# Patient Record
Sex: FEMALE | Race: WHITE | NOT HISPANIC OR LATINO | Employment: UNEMPLOYED | URBAN - NONMETROPOLITAN AREA
[De-identification: names, ages, dates, MRNs, and addresses within clinical notes are randomized per-mention and may not be internally consistent; named-entity substitution may affect disease eponyms.]

---

## 2018-05-28 ENCOUNTER — APPOINTMENT (OUTPATIENT)
Dept: CT IMAGING | Facility: HOSPITAL | Age: 28
End: 2018-05-28

## 2018-05-28 ENCOUNTER — HOSPITAL ENCOUNTER (INPATIENT)
Facility: HOSPITAL | Age: 28
LOS: 4 days | Discharge: HOME | End: 2018-06-01
Attending: EMERGENCY MEDICINE | Admitting: INTERNAL MEDICINE

## 2018-05-28 DIAGNOSIS — N10 PYELONEPHRITIS, ACUTE: Primary | ICD-10-CM

## 2018-05-28 LAB
ALBUMIN SERPL-MCNC: 3.7 G/DL (ref 3.5–5)
ALBUMIN/GLOB SERPL: 1.2 G/DL (ref 1.1–2.5)
ALP SERPL-CCNC: 120 U/L (ref 24–120)
ALT SERPL W P-5'-P-CCNC: 27 U/L (ref 0–54)
ANION GAP SERPL CALCULATED.3IONS-SCNC: 12 MMOL/L (ref 4–13)
AST SERPL-CCNC: 27 U/L (ref 7–45)
BACTERIA UR QL AUTO: ABNORMAL /HPF
BASOPHILS # BLD AUTO: 0.07 10*3/MM3 (ref 0–0.2)
BASOPHILS NFR BLD AUTO: 0.3 % (ref 0–2)
BILIRUB SERPL-MCNC: 0.4 MG/DL (ref 0.1–1)
BILIRUB UR QL STRIP: NEGATIVE
BUN BLD-MCNC: 10 MG/DL (ref 5–21)
BUN/CREAT SERPL: 18.5 (ref 7–25)
CALCIUM SPEC-SCNC: 9.6 MG/DL (ref 8.4–10.4)
CHLORIDE SERPL-SCNC: 101 MMOL/L (ref 98–110)
CLARITY UR: ABNORMAL
CO2 SERPL-SCNC: 23 MMOL/L (ref 24–31)
COLOR UR: YELLOW
CREAT BLD-MCNC: 0.54 MG/DL (ref 0.5–1.4)
D-LACTATE SERPL-SCNC: 0.7 MMOL/L (ref 0.5–2)
D-LACTATE SERPL-SCNC: 1.2 MMOL/L (ref 0.5–2)
D-LACTATE SERPL-SCNC: 2.6 MMOL/L (ref 0.5–2)
DEPRECATED RDW RBC AUTO: 44.8 FL (ref 40–54)
EOSINOPHIL # BLD AUTO: 0.01 10*3/MM3 (ref 0–0.7)
EOSINOPHIL NFR BLD AUTO: 0 % (ref 0–4)
ERYTHROCYTE [DISTWIDTH] IN BLOOD BY AUTOMATED COUNT: 14.1 % (ref 12–15)
GFR SERPL CREATININE-BSD FRML MDRD: 134 ML/MIN/1.73
GLOBULIN UR ELPH-MCNC: 3.1 GM/DL
GLUCOSE BLD-MCNC: 188 MG/DL (ref 70–100)
GLUCOSE UR STRIP-MCNC: NEGATIVE MG/DL
HCT VFR BLD AUTO: 40.8 % (ref 37–47)
HGB BLD-MCNC: 13.5 G/DL (ref 12–16)
HGB UR QL STRIP.AUTO: NEGATIVE
HOLD SPECIMEN: NORMAL
HYALINE CASTS UR QL AUTO: ABNORMAL /LPF
IMM GRANULOCYTES # BLD: 0.26 10*3/MM3 (ref 0–0.03)
IMM GRANULOCYTES NFR BLD: 1.1 % (ref 0–5)
KETONES UR QL STRIP: NEGATIVE
LEUKOCYTE ESTERASE UR QL STRIP.AUTO: ABNORMAL
LIPASE SERPL-CCNC: 30 U/L (ref 23–203)
LYMPHOCYTES # BLD AUTO: 1.46 10*3/MM3 (ref 0.72–4.86)
LYMPHOCYTES NFR BLD AUTO: 5.9 % (ref 15–45)
MCH RBC QN AUTO: 28.8 PG (ref 28–32)
MCHC RBC AUTO-ENTMCNC: 33.1 G/DL (ref 33–36)
MCV RBC AUTO: 87 FL (ref 82–98)
MONOCYTES # BLD AUTO: 1.83 10*3/MM3 (ref 0.19–1.3)
MONOCYTES NFR BLD AUTO: 7.4 % (ref 4–12)
NEUTROPHILS # BLD AUTO: 21.13 10*3/MM3 (ref 1.87–8.4)
NEUTROPHILS NFR BLD AUTO: 85.3 % (ref 39–78)
NITRITE UR QL STRIP: POSITIVE
NRBC BLD MANUAL-RTO: 0 /100 WBC (ref 0–0)
PH UR STRIP.AUTO: 6 [PH] (ref 5–8)
PLATELET # BLD AUTO: 352 10*3/MM3 (ref 130–400)
PMV BLD AUTO: 11.7 FL (ref 6–12)
POTASSIUM BLD-SCNC: 3.5 MMOL/L (ref 3.5–5.3)
PROT SERPL-MCNC: 6.8 G/DL (ref 6.3–8.7)
PROT UR QL STRIP: ABNORMAL
RBC # BLD AUTO: 4.69 10*6/MM3 (ref 4.2–5.4)
RBC # UR: ABNORMAL /HPF
REF LAB TEST METHOD: ABNORMAL
SODIUM BLD-SCNC: 136 MMOL/L (ref 135–145)
SP GR UR STRIP: 1.02 (ref 1–1.03)
SQUAMOUS #/AREA URNS HPF: ABNORMAL /HPF
UROBILINOGEN UR QL STRIP: ABNORMAL
WBC NRBC COR # BLD: 24.76 10*3/MM3 (ref 4.8–10.8)
WBC UR QL AUTO: ABNORMAL /HPF
WHOLE BLOOD HOLD SPECIMEN: NORMAL
WHOLE BLOOD HOLD SPECIMEN: NORMAL

## 2018-05-28 PROCEDURE — 74177 CT ABD & PELVIS W/CONTRAST: CPT

## 2018-05-28 PROCEDURE — 25010000002 MORPHINE PER 10 MG: Performed by: EMERGENCY MEDICINE

## 2018-05-28 PROCEDURE — 25010000002 HYDROMORPHONE PER 4 MG: Performed by: INTERNAL MEDICINE

## 2018-05-28 PROCEDURE — 25010000002 HYDROMORPHONE PER 4 MG: Performed by: EMERGENCY MEDICINE

## 2018-05-28 PROCEDURE — 87040 BLOOD CULTURE FOR BACTERIA: CPT | Performed by: INTERNAL MEDICINE

## 2018-05-28 PROCEDURE — 87086 URINE CULTURE/COLONY COUNT: CPT | Performed by: EMERGENCY MEDICINE

## 2018-05-28 PROCEDURE — 25010000002 IOPAMIDOL 61 % SOLUTION: Performed by: EMERGENCY MEDICINE

## 2018-05-28 PROCEDURE — 80053 COMPREHEN METABOLIC PANEL: CPT | Performed by: EMERGENCY MEDICINE

## 2018-05-28 PROCEDURE — 25010000002 PIPERACILLIN SOD-TAZOBACTAM PER 1 G: Performed by: INTERNAL MEDICINE

## 2018-05-28 PROCEDURE — 87040 BLOOD CULTURE FOR BACTERIA: CPT | Performed by: EMERGENCY MEDICINE

## 2018-05-28 PROCEDURE — 81001 URINALYSIS AUTO W/SCOPE: CPT | Performed by: EMERGENCY MEDICINE

## 2018-05-28 PROCEDURE — 83690 ASSAY OF LIPASE: CPT | Performed by: EMERGENCY MEDICINE

## 2018-05-28 PROCEDURE — 25010000002 CEFTRIAXONE PER 250 MG: Performed by: EMERGENCY MEDICINE

## 2018-05-28 PROCEDURE — 83605 ASSAY OF LACTIC ACID: CPT | Performed by: INTERNAL MEDICINE

## 2018-05-28 PROCEDURE — 87077 CULTURE AEROBIC IDENTIFY: CPT | Performed by: EMERGENCY MEDICINE

## 2018-05-28 PROCEDURE — 99284 EMERGENCY DEPT VISIT MOD MDM: CPT

## 2018-05-28 PROCEDURE — 83605 ASSAY OF LACTIC ACID: CPT | Performed by: EMERGENCY MEDICINE

## 2018-05-28 PROCEDURE — 85025 COMPLETE CBC W/AUTO DIFF WBC: CPT | Performed by: EMERGENCY MEDICINE

## 2018-05-28 PROCEDURE — 87186 SC STD MICRODIL/AGAR DIL: CPT | Performed by: EMERGENCY MEDICINE

## 2018-05-28 PROCEDURE — 25010000002 METOCLOPRAMIDE PER 10 MG: Performed by: EMERGENCY MEDICINE

## 2018-05-28 RX ORDER — SODIUM CHLORIDE 0.9 % (FLUSH) 0.9 %
1-10 SYRINGE (ML) INJECTION AS NEEDED
Status: DISCONTINUED | OUTPATIENT
Start: 2018-05-28 | End: 2018-06-01 | Stop reason: HOSPADM

## 2018-05-28 RX ORDER — SODIUM CHLORIDE 9 MG/ML
125 INJECTION, SOLUTION INTRAVENOUS CONTINUOUS
Status: DISCONTINUED | OUTPATIENT
Start: 2018-05-28 | End: 2018-05-30

## 2018-05-28 RX ORDER — MORPHINE SULFATE 4 MG/ML
4 INJECTION, SOLUTION INTRAMUSCULAR; INTRAVENOUS ONCE
Status: COMPLETED | OUTPATIENT
Start: 2018-05-28 | End: 2018-05-28

## 2018-05-28 RX ORDER — NALOXONE HCL 0.4 MG/ML
0.4 VIAL (ML) INJECTION
Status: DISCONTINUED | OUTPATIENT
Start: 2018-05-28 | End: 2018-05-29

## 2018-05-28 RX ORDER — HYDROMORPHONE HYDROCHLORIDE 1 MG/ML
1 INJECTION, SOLUTION INTRAMUSCULAR; INTRAVENOUS; SUBCUTANEOUS
Status: DISCONTINUED | OUTPATIENT
Start: 2018-05-28 | End: 2018-05-29

## 2018-05-28 RX ORDER — ACETAMINOPHEN 500 MG
1000 TABLET ORAL ONCE
Status: COMPLETED | OUTPATIENT
Start: 2018-05-28 | End: 2018-05-28

## 2018-05-28 RX ORDER — HYDROMORPHONE HYDROCHLORIDE 1 MG/ML
1 INJECTION, SOLUTION INTRAMUSCULAR; INTRAVENOUS; SUBCUTANEOUS ONCE
Status: COMPLETED | OUTPATIENT
Start: 2018-05-28 | End: 2018-05-28

## 2018-05-28 RX ORDER — SODIUM CHLORIDE 9 MG/ML
250 INJECTION, SOLUTION INTRAVENOUS CONTINUOUS
Status: DISCONTINUED | OUTPATIENT
Start: 2018-05-28 | End: 2018-05-28

## 2018-05-28 RX ORDER — SERTRALINE HYDROCHLORIDE 100 MG/1
100 TABLET, FILM COATED ORAL DAILY
COMMUNITY

## 2018-05-28 RX ORDER — ONDANSETRON 2 MG/ML
4 INJECTION INTRAMUSCULAR; INTRAVENOUS EVERY 6 HOURS PRN
Status: DISCONTINUED | OUTPATIENT
Start: 2018-05-28 | End: 2018-06-01 | Stop reason: HOSPADM

## 2018-05-28 RX ORDER — SERTRALINE HYDROCHLORIDE 100 MG/1
100 TABLET, FILM COATED ORAL DAILY
Status: DISCONTINUED | OUTPATIENT
Start: 2018-05-28 | End: 2018-06-01 | Stop reason: HOSPADM

## 2018-05-28 RX ORDER — METOCLOPRAMIDE HYDROCHLORIDE 5 MG/ML
10 INJECTION INTRAMUSCULAR; INTRAVENOUS ONCE
Status: COMPLETED | OUTPATIENT
Start: 2018-05-28 | End: 2018-05-28

## 2018-05-28 RX ORDER — ACETAMINOPHEN 325 MG/1
650 TABLET ORAL EVERY 4 HOURS PRN
Status: DISCONTINUED | OUTPATIENT
Start: 2018-05-28 | End: 2018-06-01 | Stop reason: HOSPADM

## 2018-05-28 RX ADMIN — METOCLOPRAMIDE 10 MG: 5 INJECTION, SOLUTION INTRAMUSCULAR; INTRAVENOUS at 15:48

## 2018-05-28 RX ADMIN — HYDROMORPHONE HYDROCHLORIDE 1 MG: 1 INJECTION, SOLUTION INTRAMUSCULAR; INTRAVENOUS; SUBCUTANEOUS at 17:32

## 2018-05-28 RX ADMIN — SODIUM CHLORIDE 250 ML/HR: 9 INJECTION, SOLUTION INTRAVENOUS at 16:45

## 2018-05-28 RX ADMIN — HYDROMORPHONE HYDROCHLORIDE 1 MG: 1 INJECTION, SOLUTION INTRAMUSCULAR; INTRAVENOUS; SUBCUTANEOUS at 19:47

## 2018-05-28 RX ADMIN — ACETAMINOPHEN 1000 MG: 500 TABLET ORAL at 18:48

## 2018-05-28 RX ADMIN — TAZOBACTAM SODIUM AND PIPERACILLIN SODIUM 3.38 G: 375; 3 INJECTION, SOLUTION INTRAVENOUS at 20:06

## 2018-05-28 RX ADMIN — ACETAMINOPHEN 650 MG: 325 TABLET, FILM COATED ORAL at 20:06

## 2018-05-28 RX ADMIN — SODIUM CHLORIDE 150 ML/HR: 9 INJECTION, SOLUTION INTRAVENOUS at 19:55

## 2018-05-28 RX ADMIN — IOPAMIDOL 100 ML: 612 INJECTION, SOLUTION INTRAVENOUS at 16:39

## 2018-05-28 RX ADMIN — CEFTRIAXONE SODIUM 1 G: 1 INJECTION, POWDER, FOR SOLUTION INTRAMUSCULAR; INTRAVENOUS at 17:16

## 2018-05-28 RX ADMIN — MORPHINE SULFATE 4 MG: 4 INJECTION INTRAVENOUS at 16:45

## 2018-05-28 RX ADMIN — MORPHINE SULFATE 4 MG: 4 INJECTION INTRAVENOUS at 15:47

## 2018-05-28 RX ADMIN — HYDROMORPHONE HYDROCHLORIDE 1 MG: 1 INJECTION, SOLUTION INTRAMUSCULAR; INTRAVENOUS; SUBCUTANEOUS at 22:11

## 2018-05-29 LAB
ALBUMIN SERPL-MCNC: 2.8 G/DL (ref 3.5–5)
ALBUMIN/GLOB SERPL: 1 G/DL (ref 1.1–2.5)
ALP SERPL-CCNC: 76 U/L (ref 24–120)
ALT SERPL W P-5'-P-CCNC: 28 U/L (ref 0–54)
ANION GAP SERPL CALCULATED.3IONS-SCNC: 6 MMOL/L (ref 4–13)
AST SERPL-CCNC: 18 U/L (ref 7–45)
BASOPHILS # BLD AUTO: 0.05 10*3/MM3 (ref 0–0.2)
BASOPHILS NFR BLD AUTO: 0.3 % (ref 0–2)
BILIRUB SERPL-MCNC: 0.1 MG/DL (ref 0.1–1)
BUN BLD-MCNC: 4 MG/DL (ref 5–21)
BUN/CREAT SERPL: 9.3 (ref 7–25)
CALCIUM SPEC-SCNC: 8.1 MG/DL (ref 8.4–10.4)
CHLORIDE SERPL-SCNC: 106 MMOL/L (ref 98–110)
CO2 SERPL-SCNC: 25 MMOL/L (ref 24–31)
CREAT BLD-MCNC: 0.43 MG/DL (ref 0.5–1.4)
D-LACTATE SERPL-SCNC: 1.2 MMOL/L (ref 0.5–2)
D-LACTATE SERPL-SCNC: 1.4 MMOL/L (ref 0.5–2)
DEPRECATED RDW RBC AUTO: 44.1 FL (ref 40–54)
EOSINOPHIL # BLD AUTO: 0.01 10*3/MM3 (ref 0–0.7)
EOSINOPHIL NFR BLD AUTO: 0.1 % (ref 0–4)
ERYTHROCYTE [DISTWIDTH] IN BLOOD BY AUTOMATED COUNT: 13.8 % (ref 12–15)
GFR SERPL CREATININE-BSD FRML MDRD: >150 ML/MIN/1.73
GLOBULIN UR ELPH-MCNC: 2.7 GM/DL
GLUCOSE BLD-MCNC: 122 MG/DL (ref 70–100)
HCT VFR BLD AUTO: 31.6 % (ref 37–47)
HGB BLD-MCNC: 10.5 G/DL (ref 12–16)
IMM GRANULOCYTES # BLD: 0.17 10*3/MM3 (ref 0–0.03)
IMM GRANULOCYTES NFR BLD: 1 % (ref 0–5)
LYMPHOCYTES # BLD AUTO: 1.13 10*3/MM3 (ref 0.72–4.86)
LYMPHOCYTES NFR BLD AUTO: 6.5 % (ref 15–45)
MCH RBC QN AUTO: 28.7 PG (ref 28–32)
MCHC RBC AUTO-ENTMCNC: 33.2 G/DL (ref 33–36)
MCV RBC AUTO: 86.3 FL (ref 82–98)
MONOCYTES # BLD AUTO: 1.35 10*3/MM3 (ref 0.19–1.3)
MONOCYTES NFR BLD AUTO: 7.8 % (ref 4–12)
NEUTROPHILS # BLD AUTO: 14.66 10*3/MM3 (ref 1.87–8.4)
NEUTROPHILS NFR BLD AUTO: 84.3 % (ref 39–78)
NRBC BLD MANUAL-RTO: 0 /100 WBC (ref 0–0)
PLATELET # BLD AUTO: 238 10*3/MM3 (ref 130–400)
PMV BLD AUTO: 11 FL (ref 6–12)
POTASSIUM BLD-SCNC: 2.7 MMOL/L (ref 3.5–5.3)
PROT SERPL-MCNC: 5.5 G/DL (ref 6.3–8.7)
RBC # BLD AUTO: 3.66 10*6/MM3 (ref 4.2–5.4)
SODIUM BLD-SCNC: 137 MMOL/L (ref 135–145)
WBC NRBC COR # BLD: 17.37 10*3/MM3 (ref 4.8–10.8)

## 2018-05-29 PROCEDURE — 83605 ASSAY OF LACTIC ACID: CPT | Performed by: INTERNAL MEDICINE

## 2018-05-29 PROCEDURE — 25010000002 ENOXAPARIN PER 10 MG: Performed by: INTERNAL MEDICINE

## 2018-05-29 PROCEDURE — 25010000002 ONDANSETRON PER 1 MG: Performed by: INTERNAL MEDICINE

## 2018-05-29 PROCEDURE — 80053 COMPREHEN METABOLIC PANEL: CPT | Performed by: INTERNAL MEDICINE

## 2018-05-29 PROCEDURE — 25010000002 HYDROMORPHONE PER 4 MG: Performed by: INTERNAL MEDICINE

## 2018-05-29 PROCEDURE — 25010000002 PIPERACILLIN SOD-TAZOBACTAM PER 1 G: Performed by: INTERNAL MEDICINE

## 2018-05-29 PROCEDURE — 85025 COMPLETE CBC W/AUTO DIFF WBC: CPT | Performed by: INTERNAL MEDICINE

## 2018-05-29 RX ORDER — POTASSIUM CHLORIDE 20MEQ/15ML
40 LIQUID (ML) ORAL ONCE
Status: COMPLETED | OUTPATIENT
Start: 2018-05-29 | End: 2018-05-29

## 2018-05-29 RX ORDER — POTASSIUM CHLORIDE 20 MEQ/1
40 TABLET, EXTENDED RELEASE ORAL DAILY
Status: DISCONTINUED | OUTPATIENT
Start: 2018-05-29 | End: 2018-05-29 | Stop reason: ALTCHOICE

## 2018-05-29 RX ORDER — POTASSIUM CHLORIDE 20 MEQ/1
40 TABLET, EXTENDED RELEASE ORAL ONCE
Status: DISCONTINUED | OUTPATIENT
Start: 2018-05-29 | End: 2018-05-29 | Stop reason: ALTCHOICE

## 2018-05-29 RX ORDER — POTASSIUM CHLORIDE 750 MG/1
40 CAPSULE, EXTENDED RELEASE ORAL ONCE
Status: DISCONTINUED | OUTPATIENT
Start: 2018-05-29 | End: 2018-05-29 | Stop reason: ALTCHOICE

## 2018-05-29 RX ORDER — OXYCODONE AND ACETAMINOPHEN 10; 325 MG/1; MG/1
1 TABLET ORAL EVERY 4 HOURS PRN
Status: DISCONTINUED | OUTPATIENT
Start: 2018-05-29 | End: 2018-06-01 | Stop reason: HOSPADM

## 2018-05-29 RX ADMIN — SODIUM CHLORIDE 125 ML/HR: 9 INJECTION, SOLUTION INTRAVENOUS at 20:15

## 2018-05-29 RX ADMIN — SODIUM CHLORIDE 125 ML/HR: 9 INJECTION, SOLUTION INTRAVENOUS at 16:31

## 2018-05-29 RX ADMIN — ACETAMINOPHEN 650 MG: 325 TABLET, FILM COATED ORAL at 03:42

## 2018-05-29 RX ADMIN — HYDROMORPHONE HYDROCHLORIDE 1 MG: 1 INJECTION, SOLUTION INTRAMUSCULAR; INTRAVENOUS; SUBCUTANEOUS at 06:33

## 2018-05-29 RX ADMIN — SERTRALINE 100 MG: 100 TABLET, FILM COATED ORAL at 08:33

## 2018-05-29 RX ADMIN — HYDROMORPHONE HYDROCHLORIDE 1 MG: 1 INJECTION, SOLUTION INTRAMUSCULAR; INTRAVENOUS; SUBCUTANEOUS at 15:47

## 2018-05-29 RX ADMIN — ONDANSETRON 4 MG: 2 INJECTION, SOLUTION INTRAMUSCULAR; INTRAVENOUS at 03:46

## 2018-05-29 RX ADMIN — TAZOBACTAM SODIUM AND PIPERACILLIN SODIUM 3.38 G: 375; 3 INJECTION, SOLUTION INTRAVENOUS at 00:54

## 2018-05-29 RX ADMIN — ENOXAPARIN SODIUM 40 MG: 40 INJECTION SUBCUTANEOUS at 20:15

## 2018-05-29 RX ADMIN — SODIUM CHLORIDE 150 ML/HR: 9 INJECTION, SOLUTION INTRAVENOUS at 09:44

## 2018-05-29 RX ADMIN — POTASSIUM CHLORIDE 40 MEQ: 20 SOLUTION ORAL at 09:40

## 2018-05-29 RX ADMIN — HYDROMORPHONE HYDROCHLORIDE 1 MG: 1 INJECTION, SOLUTION INTRAMUSCULAR; INTRAVENOUS; SUBCUTANEOUS at 08:31

## 2018-05-29 RX ADMIN — ACETAMINOPHEN 650 MG: 325 TABLET, FILM COATED ORAL at 08:35

## 2018-05-29 RX ADMIN — TAZOBACTAM SODIUM AND PIPERACILLIN SODIUM 3.38 G: 375; 3 INJECTION, SOLUTION INTRAVENOUS at 09:42

## 2018-05-29 RX ADMIN — HYDROMORPHONE HYDROCHLORIDE 1 MG: 1 INJECTION, SOLUTION INTRAMUSCULAR; INTRAVENOUS; SUBCUTANEOUS at 17:58

## 2018-05-29 RX ADMIN — HYDROMORPHONE HYDROCHLORIDE 1 MG: 1 INJECTION, SOLUTION INTRAMUSCULAR; INTRAVENOUS; SUBCUTANEOUS at 00:52

## 2018-05-29 RX ADMIN — HYDROMORPHONE HYDROCHLORIDE 1 MG: 1 INJECTION, SOLUTION INTRAMUSCULAR; INTRAVENOUS; SUBCUTANEOUS at 11:17

## 2018-05-29 RX ADMIN — POTASSIUM CHLORIDE 40 MEQ: 20 SOLUTION ORAL at 13:40

## 2018-05-29 RX ADMIN — HYDROMORPHONE HYDROCHLORIDE 1 MG: 1 INJECTION, SOLUTION INTRAMUSCULAR; INTRAVENOUS; SUBCUTANEOUS at 03:48

## 2018-05-29 RX ADMIN — ACETAMINOPHEN 650 MG: 325 TABLET, FILM COATED ORAL at 13:35

## 2018-05-29 RX ADMIN — OXYCODONE HYDROCHLORIDE AND ACETAMINOPHEN 1 TABLET: 10; 325 TABLET ORAL at 22:10

## 2018-05-29 RX ADMIN — TAZOBACTAM SODIUM AND PIPERACILLIN SODIUM 3.38 G: 375; 3 INJECTION, SOLUTION INTRAVENOUS at 16:32

## 2018-05-29 RX ADMIN — HYDROMORPHONE HYDROCHLORIDE 1 MG: 1 INJECTION, SOLUTION INTRAMUSCULAR; INTRAVENOUS; SUBCUTANEOUS at 20:14

## 2018-05-29 RX ADMIN — ONDANSETRON 4 MG: 2 INJECTION, SOLUTION INTRAMUSCULAR; INTRAVENOUS at 13:40

## 2018-05-29 RX ADMIN — ACETAMINOPHEN 650 MG: 325 TABLET, FILM COATED ORAL at 17:58

## 2018-05-29 RX ADMIN — SODIUM CHLORIDE 150 ML/HR: 9 INJECTION, SOLUTION INTRAVENOUS at 03:42

## 2018-05-29 RX ADMIN — HYDROMORPHONE HYDROCHLORIDE 1 MG: 1 INJECTION, SOLUTION INTRAMUSCULAR; INTRAVENOUS; SUBCUTANEOUS at 13:35

## 2018-05-30 LAB
ANION GAP SERPL CALCULATED.3IONS-SCNC: 6 MMOL/L (ref 4–13)
BACTERIA SPEC AEROBE CULT: ABNORMAL
BASOPHILS # BLD AUTO: 0.03 10*3/MM3 (ref 0–0.2)
BASOPHILS NFR BLD AUTO: 0.3 % (ref 0–2)
BUN BLD-MCNC: 2 MG/DL (ref 5–21)
BUN/CREAT SERPL: 4.7 (ref 7–25)
CALCIUM SPEC-SCNC: 8.1 MG/DL (ref 8.4–10.4)
CHLORIDE SERPL-SCNC: 108 MMOL/L (ref 98–110)
CO2 SERPL-SCNC: 26 MMOL/L (ref 24–31)
CREAT BLD-MCNC: 0.43 MG/DL (ref 0.5–1.4)
DEPRECATED RDW RBC AUTO: 45.5 FL (ref 40–54)
EOSINOPHIL # BLD AUTO: 0.05 10*3/MM3 (ref 0–0.7)
EOSINOPHIL NFR BLD AUTO: 0.6 % (ref 0–4)
ERYTHROCYTE [DISTWIDTH] IN BLOOD BY AUTOMATED COUNT: 14 % (ref 12–15)
GFR SERPL CREATININE-BSD FRML MDRD: >150 ML/MIN/1.73
GLUCOSE BLD-MCNC: 91 MG/DL (ref 70–100)
HCT VFR BLD AUTO: 31.7 % (ref 37–47)
HGB BLD-MCNC: 10.2 G/DL (ref 12–16)
IMM GRANULOCYTES # BLD: 0.04 10*3/MM3 (ref 0–0.03)
IMM GRANULOCYTES NFR BLD: 0.5 % (ref 0–5)
LYMPHOCYTES # BLD AUTO: 2.3 10*3/MM3 (ref 0.72–4.86)
LYMPHOCYTES NFR BLD AUTO: 26.6 % (ref 15–45)
MAGNESIUM SERPL-MCNC: 1.8 MG/DL (ref 1.4–2.2)
MCH RBC QN AUTO: 28.5 PG (ref 28–32)
MCHC RBC AUTO-ENTMCNC: 32.2 G/DL (ref 33–36)
MCV RBC AUTO: 88.5 FL (ref 82–98)
MONOCYTES # BLD AUTO: 0.74 10*3/MM3 (ref 0.19–1.3)
MONOCYTES NFR BLD AUTO: 8.6 % (ref 4–12)
NEUTROPHILS # BLD AUTO: 5.48 10*3/MM3 (ref 1.87–8.4)
NEUTROPHILS NFR BLD AUTO: 63.4 % (ref 39–78)
NRBC BLD MANUAL-RTO: 0 /100 WBC (ref 0–0)
PLATELET # BLD AUTO: 224 10*3/MM3 (ref 130–400)
PMV BLD AUTO: 11.4 FL (ref 6–12)
POTASSIUM BLD-SCNC: 3.3 MMOL/L (ref 3.5–5.3)
RBC # BLD AUTO: 3.58 10*6/MM3 (ref 4.2–5.4)
SODIUM BLD-SCNC: 140 MMOL/L (ref 135–145)
WBC NRBC COR # BLD: 8.64 10*3/MM3 (ref 4.8–10.8)

## 2018-05-30 PROCEDURE — 25010000002 LEVOFLOXACIN PER 250 MG: Performed by: NURSE PRACTITIONER

## 2018-05-30 PROCEDURE — 85025 COMPLETE CBC W/AUTO DIFF WBC: CPT | Performed by: INTERNAL MEDICINE

## 2018-05-30 PROCEDURE — 83735 ASSAY OF MAGNESIUM: CPT | Performed by: NURSE PRACTITIONER

## 2018-05-30 PROCEDURE — 80048 BASIC METABOLIC PNL TOTAL CA: CPT | Performed by: INTERNAL MEDICINE

## 2018-05-30 PROCEDURE — 25010000002 HYDROMORPHONE PER 4 MG: Performed by: FAMILY MEDICINE

## 2018-05-30 PROCEDURE — 25010000002 PIPERACILLIN SOD-TAZOBACTAM PER 1 G: Performed by: INTERNAL MEDICINE

## 2018-05-30 PROCEDURE — 25010000002 ENOXAPARIN PER 10 MG: Performed by: INTERNAL MEDICINE

## 2018-05-30 PROCEDURE — 25810000003 SODIUM CHLORIDE 0.9 % WITH KCL 20 MEQ 20-0.9 MEQ/L-% SOLUTION: Performed by: NURSE PRACTITIONER

## 2018-05-30 PROCEDURE — 25010000002 ONDANSETRON PER 1 MG: Performed by: INTERNAL MEDICINE

## 2018-05-30 PROCEDURE — 25010000002 KETOROLAC TROMETHAMINE PER 15 MG: Performed by: NURSE PRACTITIONER

## 2018-05-30 RX ORDER — LEVOFLOXACIN 5 MG/ML
750 INJECTION, SOLUTION INTRAVENOUS EVERY 24 HOURS
Status: DISCONTINUED | OUTPATIENT
Start: 2018-05-30 | End: 2018-06-01

## 2018-05-30 RX ORDER — SODIUM CHLORIDE AND POTASSIUM CHLORIDE 150; 900 MG/100ML; MG/100ML
75 INJECTION, SOLUTION INTRAVENOUS CONTINUOUS
Status: DISCONTINUED | OUTPATIENT
Start: 2018-05-30 | End: 2018-05-31

## 2018-05-30 RX ORDER — POTASSIUM CHLORIDE 1.5 G/1.77G
40 POWDER, FOR SOLUTION ORAL ONCE
Status: COMPLETED | OUTPATIENT
Start: 2018-05-30 | End: 2018-05-30

## 2018-05-30 RX ORDER — HYDROMORPHONE HYDROCHLORIDE 1 MG/ML
0.5 INJECTION, SOLUTION INTRAMUSCULAR; INTRAVENOUS; SUBCUTANEOUS ONCE
Status: COMPLETED | OUTPATIENT
Start: 2018-05-30 | End: 2018-05-30

## 2018-05-30 RX ORDER — KETOROLAC TROMETHAMINE 30 MG/ML
30 INJECTION, SOLUTION INTRAMUSCULAR; INTRAVENOUS EVERY 6 HOURS PRN
Status: DISCONTINUED | OUTPATIENT
Start: 2018-05-30 | End: 2018-06-01 | Stop reason: HOSPADM

## 2018-05-30 RX ORDER — BUTALBITAL, ACETAMINOPHEN AND CAFFEINE 50; 325; 40 MG/1; MG/1; MG/1
1 TABLET ORAL EVERY 6 HOURS PRN
Status: DISCONTINUED | OUTPATIENT
Start: 2018-05-30 | End: 2018-06-01 | Stop reason: HOSPADM

## 2018-05-30 RX ADMIN — TAZOBACTAM SODIUM AND PIPERACILLIN SODIUM 3.38 G: 375; 3 INJECTION, SOLUTION INTRAVENOUS at 01:14

## 2018-05-30 RX ADMIN — OXYCODONE HYDROCHLORIDE AND ACETAMINOPHEN 1 TABLET: 10; 325 TABLET ORAL at 14:42

## 2018-05-30 RX ADMIN — ENOXAPARIN SODIUM 40 MG: 40 INJECTION SUBCUTANEOUS at 20:19

## 2018-05-30 RX ADMIN — OXYCODONE HYDROCHLORIDE AND ACETAMINOPHEN 1 TABLET: 10; 325 TABLET ORAL at 10:36

## 2018-05-30 RX ADMIN — POTASSIUM CHLORIDE 40 MEQ: 1.5 POWDER, FOR SOLUTION ORAL at 11:31

## 2018-05-30 RX ADMIN — OXYCODONE HYDROCHLORIDE AND ACETAMINOPHEN 1 TABLET: 10; 325 TABLET ORAL at 18:42

## 2018-05-30 RX ADMIN — ONDANSETRON 4 MG: 2 INJECTION, SOLUTION INTRAMUSCULAR; INTRAVENOUS at 18:42

## 2018-05-30 RX ADMIN — KETOROLAC TROMETHAMINE 30 MG: 30 INJECTION, SOLUTION INTRAMUSCULAR at 20:19

## 2018-05-30 RX ADMIN — OXYCODONE HYDROCHLORIDE AND ACETAMINOPHEN 1 TABLET: 10; 325 TABLET ORAL at 22:42

## 2018-05-30 RX ADMIN — SODIUM CHLORIDE 125 ML/HR: 9 INJECTION, SOLUTION INTRAVENOUS at 09:18

## 2018-05-30 RX ADMIN — OXYCODONE HYDROCHLORIDE AND ACETAMINOPHEN 1 TABLET: 10; 325 TABLET ORAL at 02:10

## 2018-05-30 RX ADMIN — HYDROMORPHONE HYDROCHLORIDE 0.5 MG: 1 INJECTION, SOLUTION INTRAMUSCULAR; INTRAVENOUS; SUBCUTANEOUS at 12:33

## 2018-05-30 RX ADMIN — OXYCODONE HYDROCHLORIDE AND ACETAMINOPHEN 1 TABLET: 10; 325 TABLET ORAL at 06:11

## 2018-05-30 RX ADMIN — ONDANSETRON 4 MG: 2 INJECTION, SOLUTION INTRAMUSCULAR; INTRAVENOUS at 01:21

## 2018-05-30 RX ADMIN — TAZOBACTAM SODIUM AND PIPERACILLIN SODIUM 3.38 G: 375; 3 INJECTION, SOLUTION INTRAVENOUS at 09:17

## 2018-05-30 RX ADMIN — ONDANSETRON 4 MG: 2 INJECTION, SOLUTION INTRAMUSCULAR; INTRAVENOUS at 12:33

## 2018-05-30 RX ADMIN — BUTALBITAL, ACETAMINOPHEN, AND CAFFEINE 1 TABLET: 50; 325; 40 TABLET ORAL at 20:19

## 2018-05-30 RX ADMIN — SERTRALINE 100 MG: 100 TABLET, FILM COATED ORAL at 09:17

## 2018-05-30 RX ADMIN — BUTALBITAL, ACETAMINOPHEN, AND CAFFEINE 1 TABLET: 50; 325; 40 TABLET ORAL at 12:37

## 2018-05-30 RX ADMIN — POTASSIUM CHLORIDE AND SODIUM CHLORIDE 75 ML/HR: 900; 150 INJECTION, SOLUTION INTRAVENOUS at 14:08

## 2018-05-30 RX ADMIN — LEVOFLOXACIN 750 MG: 5 INJECTION, SOLUTION INTRAVENOUS at 11:44

## 2018-05-31 LAB
ANION GAP SERPL CALCULATED.3IONS-SCNC: 9 MMOL/L (ref 4–13)
BUN BLD-MCNC: <2 MG/DL (ref 5–21)
BUN/CREAT SERPL: ABNORMAL (ref 7–25)
CALCIUM SPEC-SCNC: 8.7 MG/DL (ref 8.4–10.4)
CHLORIDE SERPL-SCNC: 108 MMOL/L (ref 98–110)
CO2 SERPL-SCNC: 25 MMOL/L (ref 24–31)
CREAT BLD-MCNC: 0.35 MG/DL (ref 0.5–1.4)
DEPRECATED RDW RBC AUTO: 46.6 FL (ref 40–54)
ERYTHROCYTE [DISTWIDTH] IN BLOOD BY AUTOMATED COUNT: 14.1 % (ref 12–15)
GFR SERPL CREATININE-BSD FRML MDRD: >150 ML/MIN/1.73
GLUCOSE BLD-MCNC: 108 MG/DL (ref 70–100)
HCT VFR BLD AUTO: 33.9 % (ref 37–47)
HGB BLD-MCNC: 10.8 G/DL (ref 12–16)
MAGNESIUM SERPL-MCNC: 1.8 MG/DL (ref 1.4–2.2)
MCH RBC QN AUTO: 28.6 PG (ref 28–32)
MCHC RBC AUTO-ENTMCNC: 31.9 G/DL (ref 33–36)
MCV RBC AUTO: 89.7 FL (ref 82–98)
PLATELET # BLD AUTO: 231 10*3/MM3 (ref 130–400)
PMV BLD AUTO: 11.6 FL (ref 6–12)
POTASSIUM BLD-SCNC: 3.2 MMOL/L (ref 3.5–5.3)
RBC # BLD AUTO: 3.78 10*6/MM3 (ref 4.2–5.4)
SODIUM BLD-SCNC: 142 MMOL/L (ref 135–145)
WBC NRBC COR # BLD: 5.4 10*3/MM3 (ref 4.8–10.8)

## 2018-05-31 PROCEDURE — 25010000002 ENOXAPARIN PER 10 MG: Performed by: INTERNAL MEDICINE

## 2018-05-31 PROCEDURE — 25010000002 LEVOFLOXACIN PER 250 MG: Performed by: NURSE PRACTITIONER

## 2018-05-31 PROCEDURE — 80048 BASIC METABOLIC PNL TOTAL CA: CPT | Performed by: NURSE PRACTITIONER

## 2018-05-31 PROCEDURE — 25010000002 MAGNESIUM SULFATE IN D5W 1G/100ML (PREMIX) 1-5 GM/100ML-% SOLUTION: Performed by: NURSE PRACTITIONER

## 2018-05-31 PROCEDURE — 25010000002 KETOROLAC TROMETHAMINE PER 15 MG: Performed by: NURSE PRACTITIONER

## 2018-05-31 PROCEDURE — 25010000002 POTASSIUM CHLORIDE PER 2 MEQ: Performed by: NURSE PRACTITIONER

## 2018-05-31 PROCEDURE — 83735 ASSAY OF MAGNESIUM: CPT | Performed by: NURSE PRACTITIONER

## 2018-05-31 PROCEDURE — 25810000003 SODIUM CHLORIDE 0.9 % WITH KCL 20 MEQ 20-0.9 MEQ/L-% SOLUTION: Performed by: NURSE PRACTITIONER

## 2018-05-31 PROCEDURE — 25010000002 ONDANSETRON PER 1 MG: Performed by: INTERNAL MEDICINE

## 2018-05-31 PROCEDURE — 85027 COMPLETE CBC AUTOMATED: CPT | Performed by: NURSE PRACTITIONER

## 2018-05-31 RX ORDER — HYDROXYZINE HYDROCHLORIDE 25 MG/1
25 TABLET, FILM COATED ORAL 3 TIMES DAILY PRN
Status: DISCONTINUED | OUTPATIENT
Start: 2018-05-31 | End: 2018-06-01 | Stop reason: HOSPADM

## 2018-05-31 RX ORDER — MAGNESIUM SULFATE 1 G/100ML
1 INJECTION INTRAVENOUS ONCE
Status: COMPLETED | OUTPATIENT
Start: 2018-05-31 | End: 2018-05-31

## 2018-05-31 RX ORDER — POTASSIUM CHLORIDE 1.5 G/1.77G
40 POWDER, FOR SOLUTION ORAL ONCE
Status: COMPLETED | OUTPATIENT
Start: 2018-05-31 | End: 2018-05-31

## 2018-05-31 RX ORDER — POTASSIUM CHLORIDE 14.9 MG/ML
20 INJECTION INTRAVENOUS ONCE
Status: COMPLETED | OUTPATIENT
Start: 2018-05-31 | End: 2018-05-31

## 2018-05-31 RX ADMIN — HYDROXYZINE HYDROCHLORIDE 25 MG: 25 TABLET ORAL at 21:14

## 2018-05-31 RX ADMIN — BUTALBITAL, ACETAMINOPHEN, AND CAFFEINE 1 TABLET: 50; 325; 40 TABLET ORAL at 05:27

## 2018-05-31 RX ADMIN — MAGNESIUM SULFATE HEPTAHYDRATE 1 G: 1 INJECTION, SOLUTION INTRAVENOUS at 14:06

## 2018-05-31 RX ADMIN — OXYCODONE HYDROCHLORIDE AND ACETAMINOPHEN 1 TABLET: 10; 325 TABLET ORAL at 20:04

## 2018-05-31 RX ADMIN — POTASSIUM CHLORIDE AND SODIUM CHLORIDE 75 ML/HR: 900; 150 INJECTION, SOLUTION INTRAVENOUS at 02:58

## 2018-05-31 RX ADMIN — OXYCODONE HYDROCHLORIDE AND ACETAMINOPHEN 1 TABLET: 10; 325 TABLET ORAL at 07:35

## 2018-05-31 RX ADMIN — POTASSIUM CHLORIDE 20 MEQ: 200 INJECTION, SOLUTION INTRAVENOUS at 08:31

## 2018-05-31 RX ADMIN — BUTALBITAL, ACETAMINOPHEN, AND CAFFEINE 1 TABLET: 50; 325; 40 TABLET ORAL at 12:54

## 2018-05-31 RX ADMIN — POTASSIUM CHLORIDE 40 MEQ: 1.5 POWDER, FOR SOLUTION ORAL at 08:31

## 2018-05-31 RX ADMIN — OXYCODONE HYDROCHLORIDE AND ACETAMINOPHEN 1 TABLET: 10; 325 TABLET ORAL at 02:57

## 2018-05-31 RX ADMIN — OXYCODONE HYDROCHLORIDE AND ACETAMINOPHEN 1 TABLET: 10; 325 TABLET ORAL at 16:05

## 2018-05-31 RX ADMIN — KETOROLAC TROMETHAMINE 30 MG: 30 INJECTION, SOLUTION INTRAMUSCULAR at 05:27

## 2018-05-31 RX ADMIN — ONDANSETRON 4 MG: 2 INJECTION, SOLUTION INTRAMUSCULAR; INTRAVENOUS at 12:00

## 2018-05-31 RX ADMIN — KETOROLAC TROMETHAMINE 30 MG: 30 INJECTION, SOLUTION INTRAMUSCULAR at 19:02

## 2018-05-31 RX ADMIN — SERTRALINE 100 MG: 100 TABLET, FILM COATED ORAL at 08:32

## 2018-05-31 RX ADMIN — HYDROXYZINE HYDROCHLORIDE 25 MG: 25 TABLET ORAL at 13:09

## 2018-05-31 RX ADMIN — OXYCODONE HYDROCHLORIDE AND ACETAMINOPHEN 1 TABLET: 10; 325 TABLET ORAL at 11:59

## 2018-05-31 RX ADMIN — ENOXAPARIN SODIUM 40 MG: 40 INJECTION SUBCUTANEOUS at 20:05

## 2018-05-31 RX ADMIN — KETOROLAC TROMETHAMINE 30 MG: 30 INJECTION, SOLUTION INTRAMUSCULAR at 12:54

## 2018-05-31 RX ADMIN — LEVOFLOXACIN 750 MG: 5 INJECTION, SOLUTION INTRAVENOUS at 11:47

## 2018-06-01 VITALS
BODY MASS INDEX: 17.52 KG/M2 | TEMPERATURE: 97.9 F | HEART RATE: 62 BPM | OXYGEN SATURATION: 96 % | WEIGHT: 109 LBS | DIASTOLIC BLOOD PRESSURE: 80 MMHG | RESPIRATION RATE: 16 BRPM | SYSTOLIC BLOOD PRESSURE: 110 MMHG | HEIGHT: 66 IN

## 2018-06-01 LAB
ANION GAP SERPL CALCULATED.3IONS-SCNC: 8 MMOL/L (ref 4–13)
BUN BLD-MCNC: 2 MG/DL (ref 5–21)
BUN/CREAT SERPL: 4.8 (ref 7–25)
CALCIUM SPEC-SCNC: 8.8 MG/DL (ref 8.4–10.4)
CHLORIDE SERPL-SCNC: 107 MMOL/L (ref 98–110)
CO2 SERPL-SCNC: 28 MMOL/L (ref 24–31)
CREAT BLD-MCNC: 0.42 MG/DL (ref 0.5–1.4)
DEPRECATED RDW RBC AUTO: 45.4 FL (ref 40–54)
ERYTHROCYTE [DISTWIDTH] IN BLOOD BY AUTOMATED COUNT: 14 % (ref 12–15)
GFR SERPL CREATININE-BSD FRML MDRD: >150 ML/MIN/1.73
GLUCOSE BLD-MCNC: 84 MG/DL (ref 70–100)
HCT VFR BLD AUTO: 34.1 % (ref 37–47)
HGB BLD-MCNC: 10.9 G/DL (ref 12–16)
MCH RBC QN AUTO: 28.3 PG (ref 28–32)
MCHC RBC AUTO-ENTMCNC: 32 G/DL (ref 33–36)
MCV RBC AUTO: 88.6 FL (ref 82–98)
PLATELET # BLD AUTO: 253 10*3/MM3 (ref 130–400)
PMV BLD AUTO: 10.7 FL (ref 6–12)
POTASSIUM BLD-SCNC: 3.4 MMOL/L (ref 3.5–5.3)
RBC # BLD AUTO: 3.85 10*6/MM3 (ref 4.2–5.4)
SODIUM BLD-SCNC: 143 MMOL/L (ref 135–145)
WBC NRBC COR # BLD: 5.41 10*3/MM3 (ref 4.8–10.8)

## 2018-06-01 PROCEDURE — 85027 COMPLETE CBC AUTOMATED: CPT | Performed by: NURSE PRACTITIONER

## 2018-06-01 PROCEDURE — 25010000002 KETOROLAC TROMETHAMINE PER 15 MG: Performed by: NURSE PRACTITIONER

## 2018-06-01 PROCEDURE — 80048 BASIC METABOLIC PNL TOTAL CA: CPT | Performed by: NURSE PRACTITIONER

## 2018-06-01 PROCEDURE — 25010000002 LEVOFLOXACIN PER 250 MG: Performed by: NURSE PRACTITIONER

## 2018-06-01 RX ORDER — LEVOFLOXACIN 750 MG/1
750 TABLET ORAL EVERY 24 HOURS
Status: DISCONTINUED | OUTPATIENT
Start: 2018-06-02 | End: 2018-06-01 | Stop reason: HOSPADM

## 2018-06-01 RX ORDER — HYDROXYZINE HYDROCHLORIDE 25 MG/1
25 TABLET, FILM COATED ORAL EVERY 8 HOURS PRN
Qty: 12 TABLET | Refills: 0 | Status: SHIPPED | OUTPATIENT
Start: 2018-06-01

## 2018-06-01 RX ORDER — OXYCODONE AND ACETAMINOPHEN 10; 325 MG/1; MG/1
1 TABLET ORAL EVERY 4 HOURS PRN
Qty: 18 TABLET | Refills: 0 | Status: SHIPPED | OUTPATIENT
Start: 2018-06-01 | End: 2018-06-04

## 2018-06-01 RX ORDER — LEVOFLOXACIN 750 MG/1
750 TABLET ORAL EVERY 24 HOURS
Qty: 11 TABLET | Refills: 0 | Status: SHIPPED | OUTPATIENT
Start: 2018-06-02 | End: 2018-06-13

## 2018-06-01 RX ORDER — POTASSIUM CHLORIDE 1.5 G/1.77G
40 POWDER, FOR SOLUTION ORAL ONCE
Status: COMPLETED | OUTPATIENT
Start: 2018-06-01 | End: 2018-06-01

## 2018-06-01 RX ADMIN — OXYCODONE HYDROCHLORIDE AND ACETAMINOPHEN 1 TABLET: 10; 325 TABLET ORAL at 08:48

## 2018-06-01 RX ADMIN — OXYCODONE HYDROCHLORIDE AND ACETAMINOPHEN 1 TABLET: 10; 325 TABLET ORAL at 04:48

## 2018-06-01 RX ADMIN — POTASSIUM CHLORIDE 40 MEQ: 1.5 POWDER, FOR SOLUTION ORAL at 08:39

## 2018-06-01 RX ADMIN — BUTALBITAL, ACETAMINOPHEN, AND CAFFEINE 1 TABLET: 50; 325; 40 TABLET ORAL at 12:51

## 2018-06-01 RX ADMIN — KETOROLAC TROMETHAMINE 30 MG: 30 INJECTION, SOLUTION INTRAMUSCULAR at 01:44

## 2018-06-01 RX ADMIN — HYDROXYZINE HYDROCHLORIDE 25 MG: 25 TABLET ORAL at 08:47

## 2018-06-01 RX ADMIN — SERTRALINE 100 MG: 100 TABLET, FILM COATED ORAL at 08:39

## 2018-06-01 RX ADMIN — OXYCODONE HYDROCHLORIDE AND ACETAMINOPHEN 1 TABLET: 10; 325 TABLET ORAL at 12:51

## 2018-06-01 RX ADMIN — OXYCODONE HYDROCHLORIDE AND ACETAMINOPHEN 1 TABLET: 10; 325 TABLET ORAL at 00:12

## 2018-06-01 RX ADMIN — LEVOFLOXACIN 750 MG: 5 INJECTION, SOLUTION INTRAVENOUS at 11:24

## 2018-06-01 NOTE — PLAN OF CARE
Problem: Patient Care Overview  Goal: Plan of Care Review  Outcome: Ongoing (interventions implemented as appropriate)  abd pain cont. Med per order. Tolerating diet/ exercise. Voiding without difficulty, also has left neph tube with some output. Colostomy care per pt. No acute distress. Cont to monitor.   06/01/18 0121   Coping/Psychosocial   Plan of Care Reviewed With patient   Plan of Care Review   Progress improving     Goal: Individualization and Mutuality  Outcome: Ongoing (interventions implemented as appropriate)    Goal: Discharge Needs Assessment  Outcome: Ongoing (interventions implemented as appropriate)      Problem: Urinary Tract Infection (Adult)  Goal: Signs and Symptoms of Listed Potential Problems Will be Absent, Minimized or Managed (Urinary Tract Infection)  Outcome: Ongoing (interventions implemented as appropriate)

## 2018-06-01 NOTE — PROGRESS NOTES
Discharge Planning Assessment  [facility]     Patient Name: Trini Landa  MRN: [id number]  Today's Date: 6/1/2018    Admit Date: 5/28/2018          Discharge Needs Assessment     Row Name 06/01/18 1155       Living Environment    Lives With child(rachel), dependent    Provides Primary Care For child(rachel)    Family Caregiver if Needed grandparent(s);parent(s)    Row Name 06/01/18 1151       Living Environment    Lives With alone    Current Living Arrangements home/apartment/condo    Primary Care Provided by self    Provides Primary Care For no one    Family Caregiver if Needed parent(s)    Quality of Family Relationships helpful;supportive;involved    Able to Return to Prior Arrangements yes       Resource/Environmental Concerns    Resource/Environmental Concerns none    Transportation Concerns car, none       Transition Planning    Patient/Family Anticipates Transition to home    Patient/Family Anticipated Services at Transition none    Transportation Anticipated family or friend will provide       Discharge Needs Assessment    Readmission Within the Last 30 Days no previous admission in last 30 days    Concerns to be Addressed denies needs/concerns at this time    Equipment Currently Used at Home colostomy/ostomy supplies    Equipment Needed After Discharge none            Discharge Plan     Row Name 06/01/18 1157       Plan    Plan HOME    Patient/Family in Agreement with Plan yes    Plan Comments PT. LIVES INDEPENDENTLY AT HOME WITH HER MINOR CHILDREN.  SHE HAS MUCH SUPPORT FROM HER GRANDMOTHER, PER PT.  PT. DENIES ANY IDENTIFIED D/C PLANNING NEEDS AT PRESENT.  IF SITUATION CHANGES, PLEASE ADVISE.  THANKS.         Destination     No service coordination in this encounter.      Durable Medical Equipment     No service coordination in this encounter.      Dialysis/Infusion     No service coordination in this encounter.      Home Medical Care     No service coordination in this encounter.      Social Care     No  service coordination in this encounter.                Demographic Summary    No documentation.           Functional Status    No documentation.           Psychosocial    No documentation.           Abuse/Neglect    No documentation.           Legal    No documentation.           Substance Abuse    No documentation.           Patient Forms    No documentation.         GUILHERME Lund

## 2018-06-01 NOTE — DISCHARGE SUMMARY
[facility] Medicine Services  DISCHARGE SUMMARY       Date of Admission: 5/28/2018  Date of Discharge:  6/1/2018  Primary Care Physician: TED Gaona    Presenting Problem/History of Present Illness:  Pyelonephritis, acute [N10]     Final Discharge Diagnoses:  1.  Pyelonephritis-ESBL Klebsiella on culture  2.  Sepsis positive secondary to above  3.  Leukocytosis-resolved  4.  Lactic acidosis-resolved  5.  Hypokalemia, improving  6.  Chronic pain syndrome  7.  History of endometriosis status post hysterectomy-report that the ureter was clipped during surgery, patient now with left nephrostomy tube  8.  Underweight    Consults: None    Procedures Performed: None    Pertinent Test Results:   Lab Results (all)     Procedure Component Value Units Date/Time    Basic Metabolic Panel [[id number]]  (Abnormal) Collected:  06/01/18 0452    Specimen:  Blood Updated:  06/01/18 0518     Glucose 84 mg/dL      BUN 2 (L) mg/dL      Creatinine 0.42 (L) mg/dL      Sodium 143 mmol/L      Potassium 3.4 (L) mmol/L      Chloride 107 mmol/L      CO2 28.0 mmol/L      Calcium 8.8 mg/dL      eGFR Non African Amer >150 mL/min/1.73      BUN/Creatinine Ratio 4.8 (L)     Anion Gap 8.0 mmol/L     Narrative:       GFR Normal >60  Chronic Kidney Disease <60  Kidney Failure <15    CBC (No Diff) [[id number]]  (Abnormal) Collected:  06/01/18 0452    Specimen:  Blood Updated:  06/01/18 0505     WBC 5.41 10*3/mm3      RBC 3.85 (L) 10*6/mm3      Hemoglobin 10.9 (L) g/dL      Hematocrit 34.1 (L) %      MCV 88.6 fL      MCH 28.3 pg      MCHC 32.0 (L) g/dL      RDW 14.0 %      RDW-SD 45.4 fl      MPV 10.7 fL      Platelets 253 10*3/mm3     Blood Culture With KELLI - Blood, [[id number]]  (Normal) Collected:  05/28/18 2010    Specimen:  Blood from Arm, Left Updated:  05/31/18 2015     Blood Culture No growth at 3 days    Blood Culture - Blood, [[id number]]  (Normal) Collected:  05/28/18 1650    Specimen:  Blood from  Arm, Right Updated:  05/31/18 1715     Blood Culture No growth at 3 days    Blood Culture - Blood, [[id number]]  (Normal) Collected:  05/28/18 1353    Specimen:  Blood from Arm, Left Updated:  05/31/18 1631     Blood Culture No growth at 3 days    Magnesium [[id number]]  (Normal) Collected:  05/31/18 0510    Specimen:  Blood Updated:  05/31/18 0801     Magnesium 1.8 mg/dL     Basic Metabolic Panel [[id number]]  (Abnormal) Collected:  05/31/18 0510    Specimen:  Blood Updated:  05/31/18 0600     Glucose 108 (H) mg/dL      BUN <2 (L) mg/dL      Creatinine 0.35 (L) mg/dL      Sodium 142 mmol/L      Potassium 3.2 (L) mmol/L      Chloride 108 mmol/L      CO2 25.0 mmol/L      Calcium 8.7 mg/dL      eGFR Non African Amer >150 mL/min/1.73      BUN/Creatinine Ratio --     Comment: Unable to calculate Bun/Crea Ratio.        Anion Gap 9.0 mmol/L     Narrative:       GFR Normal >60  Chronic Kidney Disease <60  Kidney Failure <15    CBC (No Diff) [[id number]]  (Abnormal) Collected:  05/31/18 0510    Specimen:  Blood Updated:  05/31/18 0545     WBC 5.40 10*3/mm3      RBC 3.78 (L) 10*6/mm3      Hemoglobin 10.8 (L) g/dL      Hematocrit 33.9 (L) %      MCV 89.7 fL      MCH 28.6 pg      MCHC 31.9 (L) g/dL      RDW 14.1 %      RDW-SD 46.6 fl      MPV 11.6 fL      Platelets 231 10*3/mm3     Magnesium [[id number]]  (Normal) Collected:  05/30/18 0614    Specimen:  Blood Updated:  05/30/18 1241     Magnesium 1.8 mg/dL     Basic Metabolic Panel [[id number]]  (Abnormal) Collected:  05/30/18 0614    Specimen:  Blood Updated:  05/30/18 0705     Glucose 91 mg/dL      BUN 2 (L) mg/dL      Creatinine 0.43 (L) mg/dL      Sodium 140 mmol/L      Potassium 3.3 (L) mmol/L      Chloride 108 mmol/L      CO2 26.0 mmol/L      Calcium 8.1 (L) mg/dL      eGFR Non African Amer >150 mL/min/1.73      BUN/Creatinine Ratio 4.7 (L)     Anion Gap 6.0 mmol/L     Narrative:       GFR Normal >60  Chronic Kidney Disease <60  Kidney Failure <15    Urine Culture - Urine,  [[id number]]  (Abnormal)  (Susceptibility) Collected:  05/28/18 1609    Specimen:  Urine from Urine, Clean Catch Updated:  05/30/18 0703     Urine Culture >100,000 CFU/mL Klebsiella pneumoniae ESBL (C)     Comment:   Consider infectious disease consult.  Susceptibility results may not correlate to clinical outcomes.  Cephalosporin resistant Klebsiella, patient may be an isolation risk.       Susceptibility      Klebsiella pneumoniae ESBL     JANET     Ampicillin >=32 ug/ml Resistant     Ampicillin + Sulbactam 16 ug/ml Intermediate     Cefazolin >=64 ug/ml Resistant     Cefepime <=1 ug/ml Resistant     Ceftriaxone >=64 ug/ml Resistant     Ertapenem <=0.5 ug/ml Susceptible     ESBL Confirmation Test POS  Positive     Gentamicin >=16 ug/ml Resistant     Levofloxacin 1 ug/ml Susceptible     Meropenem <=0.25 ug/ml Susceptible     Nitrofurantoin 64 ug/ml Intermediate     Tobramycin 8 ug/ml Intermediate     Trimethoprim + Sulfamethoxazole >=320 ug/ml Resistant                    CBC Auto Differential [[id number]]  (Abnormal) Collected:  05/30/18 0614    Specimen:  Blood Updated:  05/30/18 0637     WBC 8.64 10*3/mm3      RBC 3.58 (L) 10*6/mm3      Hemoglobin 10.2 (L) g/dL      Hematocrit 31.7 (L) %      MCV 88.5 fL      MCH 28.5 pg      MCHC 32.2 (L) g/dL      RDW 14.0 %      RDW-SD 45.5 fl      MPV 11.4 fL      Platelets 224 10*3/mm3      Neutrophil % 63.4 %      Lymphocyte % 26.6 %      Monocyte % 8.6 %      Eosinophil % 0.6 %      Basophil % 0.3 %      Immature Grans % 0.5 %      Neutrophils, Absolute 5.48 10*3/mm3      Lymphocytes, Absolute 2.30 10*3/mm3      Monocytes, Absolute 0.74 10*3/mm3      Eosinophils, Absolute 0.05 10*3/mm3      Basophils, Absolute 0.03 10*3/mm3      Immature Grans, Absolute 0.04 (H) 10*3/mm3      nRBC 0.0 /100 WBC     Lactic Acid, Plasma [[id number]]  (Normal) Collected:  05/29/18 1117    Specimen:  Blood Updated:  05/29/18 1132     Lactate 1.2 mmol/L     Comprehensive Metabolic Panel [[id number]]   (Abnormal) Collected:  05/29/18 0619    Specimen:  Blood Updated:  05/29/18 0659     Glucose 122 (H) mg/dL      BUN 4 (L) mg/dL      Creatinine 0.43 (L) mg/dL      Sodium 137 mmol/L      Potassium 2.7 (C) mmol/L      Chloride 106 mmol/L      CO2 25.0 mmol/L      Calcium 8.1 (L) mg/dL      Total Protein 5.5 (L) g/dL      Albumin 2.80 (L) g/dL      ALT (SGPT) 28 U/L      AST (SGOT) 18 U/L      Alkaline Phosphatase 76 U/L      Total Bilirubin 0.1 mg/dL      eGFR Non African Amer >150 mL/min/1.73      Globulin 2.7 gm/dL      A/G Ratio 1.0 (L) g/dL      BUN/Creatinine Ratio 9.3     Anion Gap 6.0 mmol/L     Lactic Acid, Plasma [[id number]]  (Normal) Collected:  05/29/18 0522    Specimen:  Blood Updated:  05/29/18 0550     Lactate 1.4 mmol/L     CBC Auto Differential [[id number]]  (Abnormal) Collected:  05/29/18 0522    Specimen:  Blood Updated:  05/29/18 0549     WBC 17.37 (H) 10*3/mm3      RBC 3.66 (L) 10*6/mm3      Hemoglobin 10.5 (L) g/dL      Hematocrit 31.6 (L) %      MCV 86.3 fL      MCH 28.7 pg      MCHC 33.2 g/dL      RDW 13.8 %      RDW-SD 44.1 fl      MPV 11.0 fL      Platelets 238 10*3/mm3      Neutrophil % 84.3 (H) %      Lymphocyte % 6.5 (L) %      Monocyte % 7.8 %      Eosinophil % 0.1 %      Basophil % 0.3 %      Immature Grans % 1.0 %      Neutrophils, Absolute 14.66 (H) 10*3/mm3      Lymphocytes, Absolute 1.13 10*3/mm3      Monocytes, Absolute 1.35 (H) 10*3/mm3      Eosinophils, Absolute 0.01 10*3/mm3      Basophils, Absolute 0.05 10*3/mm3      Immature Grans, Absolute 0.17 (H) 10*3/mm3      nRBC 0.0 /100 WBC     Lactic Acid, Plasma [[id number]]  (Normal) Collected:  05/28/18 2325    Specimen:  Blood Updated:  05/28/18 2358     Lactate 0.7 mmol/L     Lactic Acid, Reflex [[id number]]  (Normal) Collected:  05/28/18 2010    Specimen:  Blood Updated:  05/28/18 2027     Lactate 1.2 mmol/L     Lactic Acid, Reflex Timer (This will reflex a repeat order 3-3:15 hours after ordered.) [[id number]] Collected:  05/28/18  1353    Specimen:  Blood Updated:  05/28/18 2000     Extra Tube Hold for add-ons.     Comment: Auto resulted.       Lactic Acid, Plasma [[id number]]  (Abnormal) Collected:  05/28/18 1353    Specimen:  Blood Updated:  05/28/18 1645     Lactate 2.6 (C) mmol/L     Urinalysis, Microscopic Only - Urine, Clean Catch [[id number]]  (Abnormal) Collected:  05/28/18 1609    Specimen:  Urine from Urine, Clean Catch Updated:  05/28/18 1640     RBC, UA 3-5 (A) /HPF      WBC, UA 13-20 (A) /HPF      Bacteria, UA 2+ (A) /HPF      Squamous Epithelial Cells, UA 3-6 (A) /HPF      Hyaline Casts, UA 3-6 /LPF      Methodology Manual Light Microscopy    Urinalysis With / Culture If Indicated - Urine, Clean Catch [[id number]]  (Abnormal) Collected:  05/28/18 1609    Specimen:  Urine from Urine, Clean Catch Updated:  05/28/18 1627     Color, UA Yellow     Appearance, UA Cloudy (A)     pH, UA 6.0     Specific Gravity, UA 1.020     Glucose, UA Negative     Ketones, UA Negative     Bilirubin, UA Negative     Blood, UA Negative     Protein, UA 30 mg/dL (1+) (A)     Leuk Esterase, UA Moderate (2+) (A)     Nitrite, UA Positive (A)     Urobilinogen, UA 0.2 E.U./dL     Comment: Auto resulted.       Comprehensive Metabolic Panel [[id number]]  (Abnormal) Collected:  05/28/18 1353    Specimen:  Blood Updated:  05/28/18 1445     Glucose 188 (H) mg/dL      BUN 10 mg/dL      Creatinine 0.54 mg/dL      Sodium 136 mmol/L      Potassium 3.5 mmol/L      Chloride 101 mmol/L      CO2 23.0 (L) mmol/L      Calcium 9.6 mg/dL      Total Protein 6.8 g/dL      Albumin 3.70 g/dL      ALT (SGPT) 27 U/L      AST (SGOT) 27 U/L      Alkaline Phosphatase 120 U/L      Total Bilirubin 0.4 mg/dL      eGFR Non African Amer 134 mL/min/1.73      Globulin 3.1 gm/dL      A/G Ratio 1.2 g/dL      BUN/Creatinine Ratio 18.5     Anion Gap 12.0 mmol/L     Lipase [[id number]]  (Normal) Collected:  05/28/18 1353    Specimen:  Blood Updated:  05/28/18 1445     Lipase 30 U/L     CBC Auto  Differential [[id number]]  (Abnormal) Collected:  05/28/18 1353    Specimen:  Blood Updated:  05/28/18 1442     WBC 24.76 (H) 10*3/mm3      RBC 4.69 10*6/mm3      Hemoglobin 13.5 g/dL      Hematocrit 40.8 %      MCV 87.0 fL      MCH 28.8 pg      MCHC 33.1 g/dL      RDW 14.1 %      RDW-SD 44.8 fl      MPV 11.7 fL      Platelets 352 10*3/mm3      Neutrophil % 85.3 (H) %      Lymphocyte % 5.9 (L) %      Monocyte % 7.4 %      Eosinophil % 0.0 %      Basophil % 0.3 %      Immature Grans % 1.1 %      Neutrophils, Absolute 21.13 (H) 10*3/mm3      Lymphocytes, Absolute 1.46 10*3/mm3      Monocytes, Absolute 1.83 (H) 10*3/mm3      Eosinophils, Absolute 0.01 10*3/mm3      Basophils, Absolute 0.07 10*3/mm3      Immature Grans, Absolute 0.26 (H) 10*3/mm3      nRBC 0.0 /100 WBC         Imaging Results (all)     Procedure Component Value Units Date/Time    CT Abdomen Pelvis With Contrast [[id number]] Collected:  05/28/18 1647     Updated:  05/28/18 1657    Narrative:          History:  28-year-old evaluated for fever. Pain around nephrostomy site.     Reference:  None.     Technique  Contrast-enhanced CT abdomen/pelvis was performed with coronal and  sagittal reformatted images provided.     For this CT exam, one or more of the following dose reduction techniques  was employed:  -automated exposure control  -mA and/or kVp adjustment for patient size  -iterative reconstruction      mGy-cm     Findings     Chest  Incidental scanning through the lower chest demonstrates clear lung  bases.     Abdomen  Mild fatty liver is suspected. No focal liver lesions. Gallbladder is  mildly distended. Spleen is unremarkable. No pancreatic or adrenal  abnormality. Right kidney is normal. 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 There is a left percutaneous nephrostomy tube  which is normally positioned in the upper collecting system/moiety.  There is abnormal enhancement in the upper half left kidney compatible  with  pyelonephritis. Phlegmonous changes but no organized abscess at  this time. There is no hydronephrosis.     Right lower quadrant ileostomy. Colon is underdistended and mildly  thick-walled compatible with colitis. Normal caliber appendix. No free  air, free fluid, or lymphadenopathy. Normal caliber abdominal aorta.     Pelvis  Decompression urinary bladder. Rectal suture line is seen. Presacral  soft tissue attenuation nonspecific, correlate for prior radiation.     No acute osseous abnormalities.          Impression:          1. Pyelonephritis the upper half left kidney. Well-positioned left  percutaneous nephrostomy tube.  2. Mild diffuse wall thickening of the underdistended colon suggesting a  mild colitis.  This report was finalized on 05/28/2018 16:54 by  Jhoan Olguin, .        Chief Complaint on Day of Discharge: Left flank pain    History of Present Illness on Day of Discharge: The patient is resting in bed.  She tells me she still has some left abdominal and flank pain, but she feels like an entirely new person compared to admission.    Hospital Course:  Ms. Landa Is a 28-year-old  female who follows TED Nuñez for primary care.  She has a past medical history significant for endometriosis, anxiety, and depression.  The patient presented to [facility] emergency department on 05/28/2018, visiting family in the area when she developed severe abdominal pain as well as fever.  She had not been feeling well for about 4 days prior to admission.  In the emergency department, white blood cell count was noted to be 24.76.  Lactate level was slightly elevated at 2.6.  Urinalysis showed positive nitrites, 2+ leuk esterase, 13-20 white blood cells, and 2+ bacteria.  CT of abdomen and pelvis show pyelonephritis in the upper half of the left kidney.  There is also mild diffuse wall thickening of the underdistended colon suggesting a mild colitis.  The patient was admitted to the hospitalist  "service for further evaluation and management.    The patient was initially given a dose of Rocephin in the emergency department, and was subsequently switched to Zosyn for broader spectrum coverage.  The patient continued to run a fever over the next 24 hours, with maximum temperature noted to be 103.  Urine culture showed an ESBL Klebsiella pneumonia, with only an intermediate susceptibility to ampicillin sulbactam.  The patient was then converted to Levaquin therapy based on susceptibilities.  She has tolerated Levaquin therapy well, and has been afebrile for over 48 hours.  Her white blood cell count has trended down nicely and is now within normal limits at 5.4.  Her blood cultures have remained with no growth thus far.  She will need to continue on oral Levaquin for a total of 14 days of antibiotic therapy.     The patient has exhibited hypokalemia with lowest value to 2.7.  Her potassium has been replaced both orally and IV, and she has also been given 1 g of magnesium as well.  Today her potassium is 3.4.  The patient does have a very poor intake of food and we have discussed the importance of making efforts to improve her intake with her at length, especially in light of her upcoming surgery in which her ileostomy is going to be reversed.  She states that her primary care provider has prescribed Megace for her and she will pick that up as soon as possible.      The patient is overall hemodynamically stable, much improved compared to admission, and is appropriate for discharge home today.  She will need to follow-up with her primary care provider in 1 week.  We have provided her with a 3-day supply of Percocet as needed for pain, as well as Hydroxyzine as needed for anxiety.    Condition on Discharge:  Stable    Physical Exam on Discharge:  /80 (BP Location: Right arm, Patient Position: Lying)   Pulse 62   Temp 97.9 °F (36.6 °C) (Oral)   Resp 16   Ht 167.6 cm (66\")   Wt 49.4 kg (109 lb)   SpO2 " 96%   BMI 17.59 kg/m²   Physical Exam  Constitutional: She is oriented to person, place, and time. She appears well-developed. No distress.   Thin   HENT:   Head: Normocephalic and atraumatic.   Neck: Normal range of motion. Neck supple. No JVD present. No tracheal deviation present. No thyromegaly present.   Cardiovascular: Normal rate, regular rhythm, normal heart sounds and intact distal pulses.  Exam reveals no gallop and no friction rub.    No murmur heard.  Pulmonary/Chest: Effort normal and breath sounds normal. She has no wheezes. She has no rales.   Abdominal: Soft. Bowel sounds are normal. She exhibits no distension. There is tenderness (Left flank tenderness).   Right lower quadrant ileostomy   Genitourinary:   Genitourinary Comments: Left nephrostomy   Musculoskeletal: Normal range of motion. She exhibits no edema or tenderness.   Lymphadenopathy:     She has no cervical adenopathy.   Neurological: She is alert and oriented to person, place, and time. No cranial nerve deficit.   Skin: Skin is warm and dry. No rash noted. No erythema.   Psychiatric: She has a normal mood and affect.   Vitals reviewed    Discharge Disposition:  Home or Self Care    Discharge Medications:   Trini Landa   Home Medication Instructions SAL:[id number]    Printed on:06/01/18 1580   Medication Information                      hydrOXYzine (ATARAX) 25 MG tablet  Take 1 tablet by mouth Every 8 (Eight) Hours As Needed for Anxiety.             levoFLOXacin (LEVAQUIN) 750 MG tablet  Take 1 tablet by mouth Daily for 11 doses.             oxyCODONE-acetaminophen (PERCOCET)  MG per tablet  Take 1 tablet by mouth Every 4 (Four) Hours As Needed for Moderate Pain  or Severe Pain  for up to 3 days.             sertraline (ZOLOFT) 100 MG tablet  Take 100 mg by mouth Daily.               Discharge Diet:   Diet Instructions     Diet: Regular       Discharge Diet:  Regular    Please provide patient with a list of potassium rich  foods        Activity at Discharge:   Activity Instructions     Activity as Tolerated           Discharge Care Plan/Instructions:   1.  Return for any acute or worsening symptoms  2.  Levaquin for 11 days to complete a total of 14 days  3.  Prescriptions for Percocet as needed for pain and hydroxyzine as needed for anxiety  4.  Encouraged increasing oral intake, and will provide patient with a list of potassium rich foods at discharge    Follow-up Appointments:   1.  Primary care provider in 1 week    Test Results Pending at Discharge: We will follow blood cultures to completion    TED Barrett  06/01/18  12:35 PM    Time: 40 minutes    Kervin Whitley MD  06/01/18  3:26 PM

## 2018-06-01 NOTE — PLAN OF CARE
Problem: Patient Care Overview  Goal: Plan of Care Review  Outcome: Ongoing (interventions implemented as appropriate)   06/01/18 1205   Coping/Psychosocial   Plan of Care Reviewed With patient   Plan of Care Review   Progress no change   OTHER   Outcome Summary Pt is on a regular diet. Cont to have a poor appetite. She has only consumed avg 10% of the last 5 meals. She is underweight with a BMI of 17.59. No new weight for comparison at this time. She reports she has a prescription for Megace that she has not picked up yet. Pt would most likely benefit from starting megace. She is receiving carnation breakfast essentials with each meal. Pt would benefit from continued supplement use s/p d/c.       Problem: Nutrition, Imbalanced: Inadequate Oral Intake (Adult)  Goal: Identify Related Risk Factors and Signs and Symptoms  Outcome: Outcome(s) achieved Date Met: 06/01/18    Goal: Improved Oral Intake  Outcome: Ongoing (interventions implemented as appropriate)    Goal: Prevent Further Weight Loss  Outcome: Ongoing (interventions implemented as appropriate)

## 2018-06-02 LAB
BACTERIA SPEC AEROBE CULT: NORMAL

## 2018-06-04 NOTE — PAYOR COMM NOTE
"[place] HOME 6-1-18  [phone]  UR PHONE    [phone]    Ekaterina Gallo  (28 y.o. Female)     Date of Birth Social Security Number Address Home Phone MRN    1990  [id number]  [facility] [address] [phone] [id number]    Mandaen Marital Status          None        Admission Date Admission Type Admitting Provider Attending Provider Department, Room/Bed    5/28/18 Emergency Kervin Whitley MD  [facility] 3C, 374/1    Discharge Date Discharge Disposition Discharge Destination        6/1/2018 Home or Self Care              Attending Provider:  (none)   Allergies:  Imitrex [Sumatriptan], Nucynta [Tapentadol Hcl Er]    Isolation:  Contact   Infection:  ESBL Klebsiella (05/30/18)   Code Status:  Prior    Ht:  167.6 cm (66\")   Wt:  49.4 kg (109 lb)    Admission Cmt:  None   Principal Problem:  None                Active Insurance as of 5/28/2018     Primary Coverage     Payor Plan Insurance Group Employer/Plan Group    [facility]      Payor Plan Address Payor Plan Phone Number Effective From Effective To    [address]  1/1/2018     [facility] [id number]       Subscriber Name Subscriber Birth Date Member ID       EKATERINA GALLO 1990 [id number]                 Emergency Contacts      (Rel.) Home Phone Work Phone Mobile Phone    Rica Lemus (Relative) [phone] -- --               Discharge Summary      Kervin Whitley MD at 6/1/2018 12:35 PM              [facility] Medicine Services  DISCHARGE SUMMARY       Date of Admission: 5/28/2018  Date of Discharge:  6/1/2018  Primary Care Physician: TED Gaona    Presenting Problem/History of Present Illness:  Pyelonephritis, acute [N10]     Final Discharge Diagnoses:  1.  Pyelonephritis-ESBL Klebsiella on culture  2.  Sepsis positive secondary to above  3.  Leukocytosis-resolved  4.  Lactic acidosis-resolved  5.  Hypokalemia, " improving  6.  Chronic pain syndrome  7.  History of endometriosis status post hysterectomy-report that the ureter was clipped during surgery, patient now with left nephrostomy tube  8.  Underweight    Consults: None    Procedures Performed: None    Pertinent Test Results:   Lab Results (all)     Procedure Component Value Units Date/Time    Basic Metabolic Panel [[id number]]  (Abnormal) Collected:  06/01/18 0452    Specimen:  Blood Updated:  06/01/18 0518     Glucose 84 mg/dL      BUN 2 (L) mg/dL      Creatinine 0.42 (L) mg/dL      Sodium 143 mmol/L      Potassium 3.4 (L) mmol/L      Chloride 107 mmol/L      CO2 28.0 mmol/L      Calcium 8.8 mg/dL      eGFR Non African Amer >150 mL/min/1.73      BUN/Creatinine Ratio 4.8 (L)     Anion Gap 8.0 mmol/L     Narrative:       GFR Normal >60  Chronic Kidney Disease <60  Kidney Failure <15    CBC (No Diff) [[id number]]  (Abnormal) Collected:  06/01/18 0452    Specimen:  Blood Updated:  06/01/18 0505     WBC 5.41 10*3/mm3      RBC 3.85 (L) 10*6/mm3      Hemoglobin 10.9 (L) g/dL      Hematocrit 34.1 (L) %      MCV 88.6 fL      MCH 28.3 pg      MCHC 32.0 (L) g/dL      RDW 14.0 %      RDW-SD 45.4 fl      MPV 10.7 fL      Platelets 253 10*3/mm3     Blood Culture With KELLI - Blood, [[id number]]  (Normal) Collected:  05/28/18 2010    Specimen:  Blood from Arm, Left Updated:  05/31/18 2015     Blood Culture No growth at 3 days    Blood Culture - Blood, [[id number]]  (Normal) Collected:  05/28/18 1650    Specimen:  Blood from Arm, Right Updated:  05/31/18 1715     Blood Culture No growth at 3 days    Blood Culture - Blood, [[id number]]  (Normal) Collected:  05/28/18 1353    Specimen:  Blood from Arm, Left Updated:  05/31/18 1631     Blood Culture No growth at 3 days    Magnesium [[id number]]  (Normal) Collected:  05/31/18 0510    Specimen:  Blood Updated:  05/31/18 0801     Magnesium 1.8 mg/dL     Basic Metabolic Panel [[id number]]  (Abnormal) Collected:  05/31/18 0510    Specimen:  Blood  Updated:  05/31/18 0600     Glucose 108 (H) mg/dL      BUN <2 (L) mg/dL      Creatinine 0.35 (L) mg/dL      Sodium 142 mmol/L      Potassium 3.2 (L) mmol/L      Chloride 108 mmol/L      CO2 25.0 mmol/L      Calcium 8.7 mg/dL      eGFR Non African Amer >150 mL/min/1.73      BUN/Creatinine Ratio --     Comment: Unable to calculate Bun/Crea Ratio.        Anion Gap 9.0 mmol/L     Narrative:       GFR Normal >60  Chronic Kidney Disease <60  Kidney Failure <15    CBC (No Diff) [[id number]]  (Abnormal) Collected:  05/31/18 0510    Specimen:  Blood Updated:  05/31/18 0545     WBC 5.40 10*3/mm3      RBC 3.78 (L) 10*6/mm3      Hemoglobin 10.8 (L) g/dL      Hematocrit 33.9 (L) %      MCV 89.7 fL      MCH 28.6 pg      MCHC 31.9 (L) g/dL      RDW 14.1 %      RDW-SD 46.6 fl      MPV 11.6 fL      Platelets 231 10*3/mm3     Magnesium [[id number]]  (Normal) Collected:  05/30/18 0614    Specimen:  Blood Updated:  05/30/18 1241     Magnesium 1.8 mg/dL     Basic Metabolic Panel [[id number]]  (Abnormal) Collected:  05/30/18 0614    Specimen:  Blood Updated:  05/30/18 0705     Glucose 91 mg/dL      BUN 2 (L) mg/dL      Creatinine 0.43 (L) mg/dL      Sodium 140 mmol/L      Potassium 3.3 (L) mmol/L      Chloride 108 mmol/L      CO2 26.0 mmol/L      Calcium 8.1 (L) mg/dL      eGFR Non African Amer >150 mL/min/1.73      BUN/Creatinine Ratio 4.7 (L)     Anion Gap 6.0 mmol/L     Narrative:       GFR Normal >60  Chronic Kidney Disease <60  Kidney Failure <15    Urine Culture - Urine, [[id number]]  (Abnormal)  (Susceptibility) Collected:  05/28/18 1609    Specimen:  Urine from Urine, Clean Catch Updated:  05/30/18 0703     Urine Culture >100,000 CFU/mL Klebsiella pneumoniae ESBL (C)     Comment:   Consider infectious disease consult.  Susceptibility results may not correlate to clinical outcomes.  Cephalosporin resistant Klebsiella, patient may be an isolation risk.       Susceptibility      Klebsiella pneumoniae ESBL     JANET     Ampicillin >=32  ug/ml Resistant     Ampicillin + Sulbactam 16 ug/ml Intermediate     Cefazolin >=64 ug/ml Resistant     Cefepime <=1 ug/ml Resistant     Ceftriaxone >=64 ug/ml Resistant     Ertapenem <=0.5 ug/ml Susceptible     ESBL Confirmation Test POS  Positive     Gentamicin >=16 ug/ml Resistant     Levofloxacin 1 ug/ml Susceptible     Meropenem <=0.25 ug/ml Susceptible     Nitrofurantoin 64 ug/ml Intermediate     Tobramycin 8 ug/ml Intermediate     Trimethoprim + Sulfamethoxazole >=320 ug/ml Resistant                    CBC Auto Differential [[id number]]  (Abnormal) Collected:  05/30/18 0614    Specimen:  Blood Updated:  05/30/18 0637     WBC 8.64 10*3/mm3      RBC 3.58 (L) 10*6/mm3      Hemoglobin 10.2 (L) g/dL      Hematocrit 31.7 (L) %      MCV 88.5 fL      MCH 28.5 pg      MCHC 32.2 (L) g/dL      RDW 14.0 %      RDW-SD 45.5 fl      MPV 11.4 fL      Platelets 224 10*3/mm3      Neutrophil % 63.4 %      Lymphocyte % 26.6 %      Monocyte % 8.6 %      Eosinophil % 0.6 %      Basophil % 0.3 %      Immature Grans % 0.5 %      Neutrophils, Absolute 5.48 10*3/mm3      Lymphocytes, Absolute 2.30 10*3/mm3      Monocytes, Absolute 0.74 10*3/mm3      Eosinophils, Absolute 0.05 10*3/mm3      Basophils, Absolute 0.03 10*3/mm3      Immature Grans, Absolute 0.04 (H) 10*3/mm3      nRBC 0.0 /100 WBC     Lactic Acid, Plasma [[id number]]  (Normal) Collected:  05/29/18 1117    Specimen:  Blood Updated:  05/29/18 1132     Lactate 1.2 mmol/L     Comprehensive Metabolic Panel [[id number]]  (Abnormal) Collected:  05/29/18 0619    Specimen:  Blood Updated:  05/29/18 0659     Glucose 122 (H) mg/dL      BUN 4 (L) mg/dL      Creatinine 0.43 (L) mg/dL      Sodium 137 mmol/L      Potassium 2.7 (C) mmol/L      Chloride 106 mmol/L      CO2 25.0 mmol/L      Calcium 8.1 (L) mg/dL      Total Protein 5.5 (L) g/dL      Albumin 2.80 (L) g/dL      ALT (SGPT) 28 U/L      AST (SGOT) 18 U/L      Alkaline Phosphatase 76 U/L      Total Bilirubin 0.1 mg/dL      eGFR Non   Amer >150 mL/min/1.73      Globulin 2.7 gm/dL      A/G Ratio 1.0 (L) g/dL      BUN/Creatinine Ratio 9.3     Anion Gap 6.0 mmol/L     Lactic Acid, Plasma [[id number]]  (Normal) Collected:  05/29/18 0522    Specimen:  Blood Updated:  05/29/18 0550     Lactate 1.4 mmol/L     CBC Auto Differential [[id number]]  (Abnormal) Collected:  05/29/18 0522    Specimen:  Blood Updated:  05/29/18 0549     WBC 17.37 (H) 10*3/mm3      RBC 3.66 (L) 10*6/mm3      Hemoglobin 10.5 (L) g/dL      Hematocrit 31.6 (L) %      MCV 86.3 fL      MCH 28.7 pg      MCHC 33.2 g/dL      RDW 13.8 %      RDW-SD 44.1 fl      MPV 11.0 fL      Platelets 238 10*3/mm3      Neutrophil % 84.3 (H) %      Lymphocyte % 6.5 (L) %      Monocyte % 7.8 %      Eosinophil % 0.1 %      Basophil % 0.3 %      Immature Grans % 1.0 %      Neutrophils, Absolute 14.66 (H) 10*3/mm3      Lymphocytes, Absolute 1.13 10*3/mm3      Monocytes, Absolute 1.35 (H) 10*3/mm3      Eosinophils, Absolute 0.01 10*3/mm3      Basophils, Absolute 0.05 10*3/mm3      Immature Grans, Absolute 0.17 (H) 10*3/mm3      nRBC 0.0 /100 WBC     Lactic Acid, Plasma [[id number]]  (Normal) Collected:  05/28/18 2325    Specimen:  Blood Updated:  05/28/18 2358     Lactate 0.7 mmol/L     Lactic Acid, Reflex [[id number]]  (Normal) Collected:  05/28/18 2010    Specimen:  Blood Updated:  05/28/18 2027     Lactate 1.2 mmol/L     Lactic Acid, Reflex Timer (This will reflex a repeat order 3-3:15 hours after ordered.) [[id number]] Collected:  05/28/18 1353    Specimen:  Blood Updated:  05/28/18 2000     Extra Tube Hold for add-ons.     Comment: Auto resulted.       Lactic Acid, Plasma [[id number]]  (Abnormal) Collected:  05/28/18 1353    Specimen:  Blood Updated:  05/28/18 1645     Lactate 2.6 (C) mmol/L     Urinalysis, Microscopic Only - Urine, Clean Catch [[id number]]  (Abnormal) Collected:  05/28/18 1609    Specimen:  Urine from Urine, Clean Catch Updated:  05/28/18 1640     RBC, UA 3-5 (A) /HPF      WBC, UA  13-20 (A) /HPF      Bacteria, UA 2+ (A) /HPF      Squamous Epithelial Cells, UA 3-6 (A) /HPF      Hyaline Casts, UA 3-6 /LPF      Methodology Manual Light Microscopy    Urinalysis With / Culture If Indicated - Urine, Clean Catch [[id number]]  (Abnormal) Collected:  05/28/18 1609    Specimen:  Urine from Urine, Clean Catch Updated:  05/28/18 1627     Color, UA Yellow     Appearance, UA Cloudy (A)     pH, UA 6.0     Specific Gravity, UA 1.020     Glucose, UA Negative     Ketones, UA Negative     Bilirubin, UA Negative     Blood, UA Negative     Protein, UA 30 mg/dL (1+) (A)     Leuk Esterase, UA Moderate (2+) (A)     Nitrite, UA Positive (A)     Urobilinogen, UA 0.2 E.U./dL     Comment: Auto resulted.       Comprehensive Metabolic Panel [[id number]]  (Abnormal) Collected:  05/28/18 1353    Specimen:  Blood Updated:  05/28/18 1445     Glucose 188 (H) mg/dL      BUN 10 mg/dL      Creatinine 0.54 mg/dL      Sodium 136 mmol/L      Potassium 3.5 mmol/L      Chloride 101 mmol/L      CO2 23.0 (L) mmol/L      Calcium 9.6 mg/dL      Total Protein 6.8 g/dL      Albumin 3.70 g/dL      ALT (SGPT) 27 U/L      AST (SGOT) 27 U/L      Alkaline Phosphatase 120 U/L      Total Bilirubin 0.4 mg/dL      eGFR Non African Amer 134 mL/min/1.73      Globulin 3.1 gm/dL      A/G Ratio 1.2 g/dL      BUN/Creatinine Ratio 18.5     Anion Gap 12.0 mmol/L     Lipase [[id number]]  (Normal) Collected:  05/28/18 1353    Specimen:  Blood Updated:  05/28/18 1445     Lipase 30 U/L     CBC Auto Differential [[id number]]  (Abnormal) Collected:  05/28/18 1353    Specimen:  Blood Updated:  05/28/18 1442     WBC 24.76 (H) 10*3/mm3      RBC 4.69 10*6/mm3      Hemoglobin 13.5 g/dL      Hematocrit 40.8 %      MCV 87.0 fL      MCH 28.8 pg      MCHC 33.1 g/dL      RDW 14.1 %      RDW-SD 44.8 fl      MPV 11.7 fL      Platelets 352 10*3/mm3      Neutrophil % 85.3 (H) %      Lymphocyte % 5.9 (L) %      Monocyte % 7.4 %      Eosinophil % 0.0 %      Basophil % 0.3 %       Immature Grans % 1.1 %      Neutrophils, Absolute 21.13 (H) 10*3/mm3      Lymphocytes, Absolute 1.46 10*3/mm3      Monocytes, Absolute 1.83 (H) 10*3/mm3      Eosinophils, Absolute 0.01 10*3/mm3      Basophils, Absolute 0.07 10*3/mm3      Immature Grans, Absolute 0.26 (H) 10*3/mm3      nRBC 0.0 /100 WBC         Imaging Results (all)     Procedure Component Value Units Date/Time    CT Abdomen Pelvis With Contrast [[id number]] Collected:  05/28/18 1647     Updated:  05/28/18 1657    Narrative:          History:  28-year-old evaluated for fever. Pain around nephrostomy site.     Reference:  None.     Technique  Contrast-enhanced CT abdomen/pelvis was performed with coronal and  sagittal reformatted images provided.     For this CT exam, one or more of the following dose reduction techniques  was employed:  -automated exposure control  -mA and/or kVp adjustment for patient size  -iterative reconstruction      mGy-cm     Findings     Chest  Incidental scanning through the lower chest demonstrates clear lung  bases.     Abdomen  Mild fatty liver is suspected. No focal liver lesions. Gallbladder is  mildly distended. Spleen is unremarkable. No pancreatic or adrenal  abnormality. Right kidney is normal. 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 There is a left percutaneous nephrostomy tube  which is normally positioned in the upper collecting system/moiety.  There is abnormal enhancement in the upper half left kidney compatible  with pyelonephritis. Phlegmonous changes but no organized abscess at  this time. There is no hydronephrosis.     Right lower quadrant ileostomy. Colon is underdistended and mildly  thick-walled compatible with colitis. Normal caliber appendix. No free  air, free fluid, or lymphadenopathy. Normal caliber abdominal aorta.     Pelvis  Decompression urinary bladder. Rectal suture line is seen. Presacral  soft tissue attenuation nonspecific, correlate for prior radiation.     No  acute osseous abnormalities.          Impression:          1. Pyelonephritis the upper half left kidney. Well-positioned left  percutaneous nephrostomy tube.  2. Mild diffuse wall thickening of the underdistended colon suggesting a  mild colitis.  This report was finalized on 05/28/2018 16:54 by  Jhoan Olguin, .        Chief Complaint on Day of Discharge: Left flank pain    History of Present Illness on Day of Discharge: The patient is resting in bed.  She tells me she still has some left abdominal and flank pain, but she feels like an entirely new person compared to admission.    Hospital Course:  Ms. Landa Is a 28-year-old  female who follows TED Nuñez for primary care.  She has a past medical history significant for endometriosis, anxiety, and depression.  The patient presented to [facility] emergency department on 05/28/2018, visiting family in the area when she developed severe abdominal pain as well as fever.  She had not been feeling well for about 4 days prior to admission.  In the emergency department, white blood cell count was noted to be 24.76.  Lactate level was slightly elevated at 2.6.  Urinalysis showed positive nitrites, 2+ leuk esterase, 13-20 white blood cells, and 2+ bacteria.  CT of abdomen and pelvis show pyelonephritis in the upper half of the left kidney.  There is also mild diffuse wall thickening of the underdistended colon suggesting a mild colitis.  The patient was admitted to the hospitalist service for further evaluation and management.    The patient was initially given a dose of Rocephin in the emergency department, and was subsequently switched to Zosyn for broader spectrum coverage.  The patient continued to run a fever over the next 24 hours, with maximum temperature noted to be 103.  Urine culture showed an ESBL Klebsiella pneumonia, with only an intermediate susceptibility to ampicillin sulbactam.  The patient was then converted to Levaquin therapy  "based on susceptibilities.  She has tolerated Levaquin therapy well, and has been afebrile for over 48 hours.  Her white blood cell count has trended down nicely and is now within normal limits at 5.4.  Her blood cultures have remained with no growth thus far.  She will need to continue on oral Levaquin for a total of 14 days of antibiotic therapy.     The patient has exhibited hypokalemia with lowest value to 2.7.  Her potassium has been replaced both orally and IV, and she has also been given 1 g of magnesium as well.  Today her potassium is 3.4.  The patient does have a very poor intake of food and we have discussed the importance of making efforts to improve her intake with her at length, especially in light of her upcoming surgery in which her ileostomy is going to be reversed.  She states that her primary care provider has prescribed Megace for her and she will pick that up as soon as possible.      The patient is overall hemodynamically stable, much improved compared to admission, and is appropriate for discharge home today.  She will need to follow-up with her primary care provider in 1 week.  We have provided her with a 3-day supply of Percocet as needed for pain, as well as Hydroxyzine as needed for anxiety.    Condition on Discharge:  Stable    Physical Exam on Discharge:  /80 (BP Location: Right arm, Patient Position: Lying)   Pulse 62   Temp 97.9 °F (36.6 °C) (Oral)   Resp 16   Ht 167.6 cm (66\")   Wt 49.4 kg (109 lb)   SpO2 96%   BMI 17.59 kg/m²    Physical Exam  Constitutional: She is oriented to person, place, and time. She appears well-developed. No distress.   Thin   HENT:   Head: Normocephalic and atraumatic.   Neck: Normal range of motion. Neck supple. No JVD present. No tracheal deviation present. No thyromegaly present.   Cardiovascular: Normal rate, regular rhythm, normal heart sounds and intact distal pulses.  Exam reveals no gallop and no friction rub.    No " murmur heard.  Pulmonary/Chest: Effort normal and breath sounds normal. She has no wheezes. She has no rales.   Abdominal: Soft. Bowel sounds are normal. She exhibits no distension. There is tenderness (Left flank tenderness).   Right lower quadrant ileostomy   Genitourinary:   Genitourinary Comments: Left nephrostomy   Musculoskeletal: Normal range of motion. She exhibits no edema or tenderness.   Lymphadenopathy:     She has no cervical adenopathy.   Neurological: She is alert and oriented to person, place, and time. No cranial nerve deficit.   Skin: Skin is warm and dry. No rash noted. No erythema.   Psychiatric: She has a normal mood and affect.   Vitals reviewed    Discharge Disposition:  Home or Self Care    Discharge Medications:   Trini Landa   Home Medication Instructions SAL:[id number]    Printed on:06/01/18 9440   Medication Information                      hydrOXYzine (ATARAX) 25 MG tablet  Take 1 tablet by mouth Every 8 (Eight) Hours As Needed for Anxiety.             levoFLOXacin (LEVAQUIN) 750 MG tablet  Take 1 tablet by mouth Daily for 11 doses.             oxyCODONE-acetaminophen (PERCOCET)  MG per tablet  Take 1 tablet by mouth Every 4 (Four) Hours As Needed for Moderate Pain  or Severe Pain  for up to 3 days.             sertraline (ZOLOFT) 100 MG tablet  Take 100 mg by mouth Daily.               Discharge Diet:   Diet Instructions     Diet: Regular       Discharge Diet:  Regular    Please provide patient with a list of potassium rich foods        Activity at Discharge:   Activity Instructions     Activity as Tolerated           Discharge Care Plan/Instructions:   1.  Return for any acute or worsening symptoms  2.  Levaquin for 11 days to complete a total of 14 days  3.  Prescriptions for Percocet as needed for pain and hydroxyzine as needed for anxiety  4.  Encouraged increasing oral intake, and will provide patient with a list of potassium rich foods at discharge    Follow-up  Appointments:   1.  Primary care provider in 1 week    Test Results Pending at Discharge: We will follow blood cultures to completion    TED Barrett  06/01/18  12:35 PM    Time: 40 minutes    Kervin Whitley MD  06/01/18  3:26 PM        Electronically signed by Kervin Whitley MD at 6/1/2018  3:26 PM